# Patient Record
Sex: MALE | Race: WHITE | NOT HISPANIC OR LATINO | URBAN - NONMETROPOLITAN AREA
[De-identification: names, ages, dates, MRNs, and addresses within clinical notes are randomized per-mention and may not be internally consistent; named-entity substitution may affect disease eponyms.]

---

## 2017-02-08 PROBLEM — H52.4: Noted: 2017-02-08

## 2017-02-08 PROBLEM — H25.13: Noted: 2019-01-29

## 2019-01-29 ENCOUNTER — IMPORTED ENCOUNTER (OUTPATIENT)
Dept: URBAN - NONMETROPOLITAN AREA CLINIC 1 | Facility: CLINIC | Age: 74
End: 2019-01-29

## 2019-01-29 PROCEDURE — S0621 ROUTINE OPHTHALMOLOGICAL EXA: HCPCS

## 2019-01-29 NOTE — PATIENT DISCUSSION
Presbyopia OUDiscussed refractive status in detail with patient. MR done today but do not recommend updating due to cataract progression. [place] OUDiscussed diagnosis with patient. Reviewed symptoms related to cataract progression. Discussed various treatment options with patient. Recommend cataract evaluation by Dr. Mee Collins. Patient will call and schedule when he is ready to proceed with evalution.

## 2022-04-09 ASSESSMENT — TONOMETRY
OS_IOP_MMHG: 19
OD_IOP_MMHG: 19

## 2022-04-09 ASSESSMENT — VISUAL ACUITY
OD_SC: 20/30
OS_SC: 20/50